# Patient Record
Sex: FEMALE | Race: WHITE | Employment: OTHER | URBAN - METROPOLITAN AREA
[De-identification: names, ages, dates, MRNs, and addresses within clinical notes are randomized per-mention and may not be internally consistent; named-entity substitution may affect disease eponyms.]

---

## 2023-11-27 ENCOUNTER — HOSPITAL ENCOUNTER (EMERGENCY)
Age: 68
Discharge: HOME | End: 2023-11-28
Attending: EMERGENCY MEDICINE
Payer: MEDICARE

## 2023-11-27 DIAGNOSIS — M54.50 CHRONIC LEFT-SIDED LOW BACK PAIN WITHOUT SCIATICA: Primary | ICD-10-CM

## 2023-11-27 DIAGNOSIS — G89.29 CHRONIC LEFT-SIDED LOW BACK PAIN WITHOUT SCIATICA: Primary | ICD-10-CM

## 2023-11-27 PROCEDURE — 99283 EMERGENCY DEPT VISIT LOW MDM: CPT

## 2023-11-27 RX ORDER — METHOCARBAMOL 500 MG/1
1500 TABLET, FILM COATED ORAL
Status: COMPLETED | OUTPATIENT
Start: 2023-11-27 | End: 2023-11-28

## 2023-11-28 VITALS
SYSTOLIC BLOOD PRESSURE: 107 MMHG | DIASTOLIC BLOOD PRESSURE: 54 MMHG | HEART RATE: 62 BPM | RESPIRATION RATE: 20 BRPM | OXYGEN SATURATION: 91 % | TEMPERATURE: 97.8 F

## 2023-11-28 PROCEDURE — 6370000000 HC RX 637 (ALT 250 FOR IP): Performed by: EMERGENCY MEDICINE

## 2023-11-28 RX ADMIN — METHOCARBAMOL TABLETS 1500 MG: 500 TABLET, COATED ORAL at 00:12

## 2023-11-28 ASSESSMENT — PAIN DESCRIPTION - DESCRIPTORS: DESCRIPTORS: STABBING

## 2023-11-28 ASSESSMENT — PAIN DESCRIPTION - ORIENTATION: ORIENTATION: LEFT

## 2023-11-28 ASSESSMENT — PAIN SCALES - GENERAL: PAINLEVEL_OUTOF10: 10

## 2023-11-28 ASSESSMENT — PAIN DESCRIPTION - LOCATION: LOCATION: HIP

## 2023-11-28 NOTE — ED NOTES
Report called to Lilibeth Salomon at [facility].      Navid Wiley RN  11/28/23 [id number]

## 2023-11-28 NOTE — ED NOTES
Called [facility] to transport patient back to [facility], [email or web address].  ETA: 2:45 AM.     Cheryle Coad, Marjiliisa A  11/28/23 0144

## 2023-11-28 NOTE — ED TRIAGE NOTES
Patient arrives via ems from facility c/o back pain and L hip and leg pain. Denies fall, pain is chronic and worsening. Torodol 30mg given by ems enroute. VSS.

## 2023-11-28 NOTE — DISCHARGE INSTRUCTIONS
Please return with any fevers, vomiting, difficulty breathing, worsening symptoms, or additional concerns. Follow-up with your regular doctors for reevaluation as needed.

## 2023-11-28 NOTE — ED PROVIDER NOTES
Emergency Department Provider Note       PCP: No primary care provider on file. Age: 80 y.o. Sex: female     DISPOSITION Decision To Discharge 11/28/2023 01:14:19 AM       ICD-10-CM    1. Chronic left-sided low back pain without sciatica  M54.50     G89.29           Medical Decision Making     She did not want any narcotic pain medicine. She has already received an NSAID in the form of Toradol. I will try different muscle relaxer with some Robaxin and she says ice packs have helped in the past so I will give her an ice pack. Complexity of Problems Addressed:  Complexity of Problem: 1 acute, uncomplicated illness or injury. Data Reviewed and Analyzed:  I independently ordered and reviewed each unique test.             Discussion of management or test interpretation. ED Course as of 11/28/23 0115 Tue Nov 28, 2023 0114 Patient says her symptoms are improved. I will plan to discharge her home. [AC]      ED Course User Index  [AC] Juan Antonio Baeza MD       Risk of Complications and/or Morbidity of Patient Management:  Shared medical decision making was utilized in creating the patients health plan today. History      81-year-old lady presents with pain in her left hip area that she says is a muscle spasm. Patient says she gets these all the time. She denies any specific injury or fall. She says she has had no bowel or bladder problems. Patient says that she took a Flexeril at her living facility and it did not help so she wanted to get transferred here for further care. She says in the past Toradol has helped and EMS gave her 30 of Toradol. Patient says she did not want anything narcotic because those never do anything for her. She said ice packs are sometimes helpful but she was unable to get 1 at the nursing facility because she said there is nobody there to be able to help her. No other associated symptoms. Elements of this note were created using speech recognition software.